# Patient Record
Sex: MALE | URBAN - METROPOLITAN AREA
[De-identification: names, ages, dates, MRNs, and addresses within clinical notes are randomized per-mention and may not be internally consistent; named-entity substitution may affect disease eponyms.]

---

## 2021-08-23 ENCOUNTER — TELEPHONE (OUTPATIENT)
Dept: FAMILY MEDICINE CLINIC | Age: 36
End: 2021-08-23

## 2021-08-23 NOTE — TELEPHONE ENCOUNTER
Patient appointment was cancelled per Dr Elmer Zuñiga. Not taking on anymore ADHD controlled substance patient's. Not to be r/s with Dr London Salcedo or Aaron Kirk.

## 2021-08-31 ENCOUNTER — OFFICE VISIT (OUTPATIENT)
Dept: PRIMARY CARE CLINIC | Age: 36
End: 2021-08-31

## 2021-08-31 VITALS
SYSTOLIC BLOOD PRESSURE: 124 MMHG | OXYGEN SATURATION: 98 % | HEART RATE: 64 BPM | DIASTOLIC BLOOD PRESSURE: 72 MMHG | WEIGHT: 189 LBS | BODY MASS INDEX: 27.99 KG/M2 | HEIGHT: 69 IN | TEMPERATURE: 97.9 F

## 2021-08-31 DIAGNOSIS — Z00.00 HEALTHCARE MAINTENANCE: ICD-10-CM

## 2021-08-31 DIAGNOSIS — G47.00 INSOMNIA, UNSPECIFIED TYPE: Primary | ICD-10-CM

## 2021-08-31 PROCEDURE — 99203 OFFICE O/P NEW LOW 30 MIN: CPT

## 2021-08-31 ASSESSMENT — PATIENT HEALTH QUESTIONNAIRE - PHQ9
SUM OF ALL RESPONSES TO PHQ QUESTIONS 1-9: 0
2. FEELING DOWN, DEPRESSED OR HOPELESS: 0
SUM OF ALL RESPONSES TO PHQ9 QUESTIONS 1 & 2: 0
SUM OF ALL RESPONSES TO PHQ QUESTIONS 1-9: 0
SUM OF ALL RESPONSES TO PHQ QUESTIONS 1-9: 0
1. LITTLE INTEREST OR PLEASURE IN DOING THINGS: 0

## 2021-08-31 ASSESSMENT — ENCOUNTER SYMPTOMS
SHORTNESS OF BREATH: 0
COUGH: 0
DIARRHEA: 0
BACK PAIN: 0
NAUSEA: 0
CHEST TIGHTNESS: 0
VOMITING: 0

## 2021-08-31 NOTE — PROGRESS NOTES
[address] [place] [address]. [facility] [address] [facility] [place] [address]         Phone: [phone]      Name:  Teto Erickson  :    1985    Consultants:   Patient Care Team:  Machelle Chase DO as PCP - General (Family Medicine)    Chief Complaint:     Teto Erickson is a 39 y.o. male who presents today for a New Patient care visit with Personalized Prevention Plan Services as noted below. HPI:     Patient presents today to establish care. Primary concerns include decreased concentration and sleeping difficulties. Decreased concentration:  Patient states that he has been having difficulty concentrating on certain tasks since college. He is concerned about ADHD. 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 However, whenever he is doing certain tasks outside of work he is unable to keep focus for long periods of time. He has missed deadlines due to the lack of concentration. Difficulty sleeping:  Patient states that he has issues with sleeping in the recent months. States that he does not have much difficulty falling asleep, but he will wake up in the middle of the night. After awakening, he will think about multiple issues and not want to fall back asleep. States he typically takes melatonin 5 to 10 mg nightly which seems to help him stay asleep on most nights. Patient is able to get 8 to 9 hours of sleep every night but mentions that his smart watch will show poor sleep quality. Patient Active Problem List   Diagnosis    Insomnia         Past Medical History:    History reviewed. No pertinent past medical history. Past Surgical History:  History reviewed. No pertinent surgical history. Home Meds:  Prior to Visit Medications    Not on File       Allergies:    Patient has no known allergies.     Family History:   History reviewed. No pertinent family history. Health Maintenance Completed:  Health Maintenance   Topic Date Due    Hepatitis C screen  Never done    Varicella vaccine (1 of 2 - 2-dose childhood series) Never done    COVID-19 Vaccine (1) Never done    HIV screen  Never done    DTaP/Tdap/Td vaccine (1 - Tdap) Never done    Flu vaccine (1) 09/01/2021    Hepatitis A vaccine  Aged Out    Hepatitis B vaccine  Aged Out    Hib vaccine  Aged Out    Meningococcal (ACWY) vaccine  Aged Out    Pneumococcal 0-64 years Vaccine  Aged Out            There is no immunization history on file for this patient. Review of Systems:  Review of Systems   Constitutional: Negative for activity change, chills and fever. Respiratory: Negative for cough, chest tightness and shortness of breath. Cardiovascular: Negative for chest pain, palpitations and leg swelling. Gastrointestinal: Negative for diarrhea, nausea and vomiting. Genitourinary: Negative for dysuria and hematuria. Musculoskeletal: Negative for arthralgias, back pain and gait problem. Neurological: Negative for light-headedness and headaches. Psychiatric/Behavioral: Positive for decreased concentration and sleep disturbance. The patient is not nervous/anxious and is not hyperactive. Physical Exam:   Vitals:    08/31/21 1354   BP: 124/72   Pulse: 64   Temp: 97.9 °F (36.6 °C)   TempSrc: Temporal   SpO2: 98%   Weight: 189 lb (85.7 kg)   Height: 5' 9\" (1.753 m)     Body mass index is 27.91 kg/m². Wt Readings from Last 3 Encounters:   08/31/21 189 lb (85.7 kg)       BP Readings from Last 3 Encounters:   08/31/21 124/72       Physical Exam  Constitutional:       Appearance: Normal appearance. Cardiovascular:      Rate and Rhythm: Normal rate and regular rhythm. Pulses: Normal pulses. Heart sounds: Normal heart sounds. Pulmonary:      Effort: Pulmonary effort is normal.      Breath sounds: Normal breath sounds. Abdominal:      General: There is no distension. Palpations: Abdomen is soft. There is no mass. Tenderness: There is no abdominal tenderness. Skin:     General: Skin is warm and dry. Neurological:      General: No focal deficit present. Mental Status: He is alert and oriented to person, place, and time. Lab Review: not applicable       Assessment/Plan:  Wendy Barcenas was seen today for [facility] care and other. Diagnoses and all orders for this visit:    Insomnia, unspecified type  Continue taking melatonin 10 mg nightly and try meditation to help fall asleep  -Counseled patient on sleep hygiene includin. avoiding heavy meal before bedtime   2.  avoiding naps during the day   3.  avoiding use of electronic devices before bedtime   4. avoiding vigorous physical exercise prior to bed   5.  decreasing caffeine consumption   6. going to sleep at the same time each night   7. regular daily exercise     Healthcare maintenance  To be completed before next visit  -     HEPATITIS C ANTIBODY; Future  -     VARICELLA ZOSTER ANTIBODY, IGG; Future  -     HIV-1 AND HIV-2 ANTIBODIES; Future  -     HEMOGLOBIN A1C; Future  -     Lipid, Fasting; Future      Health Maintenance Due:  Health Maintenance Due   Topic Date Due    Hepatitis C screen  Never done    Varicella vaccine (1 of 2 - 2-dose childhood series) Never done    COVID-19 Vaccine (1) Never done    HIV screen  Never done    DTaP/Tdap/Td vaccine (1 - Tdap) Never done        Health Maintenance: (USPSTF Recommendations)  (M) Prostate Cancer Screen: (54-79 yo discuss benefits/harm, does not recommend testing PSA in men >73 yo (D):   (M) AAA Screen: (men 73-69 yo who has ever smoked (B), consider in nonsmokers if high risk):  CRC/Colonoscopy Screening: (adults 39-53 (B), 50-75 (A))  Lung Ca Screening: Annual LDCT (+smoker age 49-80, smoked within 15 years, total of 20 pack yr history (B)):   HIV Screen: (16-65 yr old, and all pregnant patients (A)):  Hep C Screen: (18-79 yr old (B)):  HCC Screen: (all pts with cirrhosis and high risk Hep B (US q6 mo)):  Immunizations:       RTC:  Return in about 3 months (around 11/30/2021) for Chronic Care Follow-Up. Follow-up on insomnia and decreased concentration  Follow-up on labs for health maintenance      EMR Dragon/transcription disclaimer:  Much of this encounter note is electronic transcription/translation of spoken language to printed texts. The electronic translation of spoken language may be erroneous, or at times, nonsensical words or phrases may be inadvertently transcribed.   Although I have reviewed the note for such errors, some may still exist.     Marcell Marie DO  8/31/2021